# Patient Record
Sex: FEMALE | Race: BLACK OR AFRICAN AMERICAN | URBAN - METROPOLITAN AREA
[De-identification: names, ages, dates, MRNs, and addresses within clinical notes are randomized per-mention and may not be internally consistent; named-entity substitution may affect disease eponyms.]

---

## 2017-04-12 ENCOUNTER — HISTORICAL (OUTPATIENT)
Dept: ADMINISTRATIVE | Facility: HOSPITAL | Age: 52
End: 2017-04-12

## 2018-01-25 ENCOUNTER — HISTORICAL (OUTPATIENT)
Dept: ADMINISTRATIVE | Facility: HOSPITAL | Age: 53
End: 2018-01-25

## 2018-11-01 ENCOUNTER — HISTORICAL (OUTPATIENT)
Dept: ADMINISTRATIVE | Facility: HOSPITAL | Age: 53
End: 2018-11-01

## 2021-09-28 ENCOUNTER — HISTORICAL (OUTPATIENT)
Dept: ADMINISTRATIVE | Facility: HOSPITAL | Age: 56
End: 2021-09-28

## 2022-04-11 ENCOUNTER — HISTORICAL (OUTPATIENT)
Dept: ADMINISTRATIVE | Facility: HOSPITAL | Age: 57
End: 2022-04-11

## 2022-04-24 VITALS
SYSTOLIC BLOOD PRESSURE: 134 MMHG | HEIGHT: 65 IN | WEIGHT: 291 LBS | DIASTOLIC BLOOD PRESSURE: 79 MMHG | BODY MASS INDEX: 48.48 KG/M2

## 2022-05-04 NOTE — HISTORICAL [facility] CERNER
This is a historical note converted from [facility]. Formatting and pictures may have been removed.  Please reference [facility] for original formatting and attached multimedia. Chief Complaint  New patient here with right knee pain. Pain x6 months. Better but still feels discomfort.  History of Present Illness  Patient comes in today for her first visit. ?Patient complains of right knee pain. ?She denies any specific trauma or recent injury.? She states she has pain while getting up from a seated position pain with stairs pain with long standing and walking not relieved the rest of medication. ?She denies any instability?she does complain of some grinding sensation. ?She denies any radiculopathy she denies any other complaints.  Physical Exam  Vitals & Measurements  HR:?91?(Peripheral)? BP:?134/79?  HT:?166?cm? HT:?166?cm? HT:?166?cm? WT:?132?kg? WT:?132?kg? WT:?132?kg? BMI:?47.9?  Patient is one is well-developed female she is awake alert and oriented ?3 she is in no apparent distress she is pleasant and cooperative. ?Examination of the right lower extremity compartment soft and warm. ?Skin is intact. ?There is no signs or symptoms of DVT or infection.? Examination of the right knee there is no effusion she is tender about the patellofemoral joint positive patella grind negative apprehension. ?Her motion is 5-115?. ?She is stable to stressing. ?She walks a slight antalgic gait there is no calf tenderness she is neurovascular intact distally.? X-rays 3 views the right knee demonstrates arthritic changes  Assessment/Plan  1.?Osteoarthritis of right knee  ? At this time we discussed her physical exam and x-ray findings.? I suspect she likely has aggravated her pre-existing arthritis. ?We have discussed some patellofemoral tracking?exercises. ?We have discussed some low impact activities. ?She was given a pamphlet on this. ?Under sterile technique she tolerated a steroid injection very well to the anterolateral portal of her  right knee.? I would like to see her back in 4 weeks to see how she is progressing.  Ordered:  dexamethasone, 8 mg, Intra-Articular, Once, first dose 01/25/18 16:00:00 CST, stop date 01/25/18 16:00:00 CST  Lidocaine inj., 5 mL, Intra-Articular, Once, first dose 01/25/18 15:41:00 CST, stop date 01/25/18 15:41:00 CST  asp/inj jnt/bursa, major [id number] PC, 01/25/18 15:41:00 CST, [facility] - [facility], Routine, 01/25/18 15:41:00 CST  Clinic Follow up, *Est. 02/22/18 3:00:00 CST, Order for future visit, Osteoarthritis of right knee  Chondromalacia, [facility]  Office/Outpatient Visit Level 3 New [id number] PC, Osteoarthritis of right knee  Chondromalacia, [facility], 01/25/18 15:41:00 CST  ?  2.?Chondromalacia  Ordered:  dexamethasone, 8 mg, Intra-Articular, Once, first dose 01/25/18 16:00:00 CST, stop date 01/25/18 16:00:00 CST  Lidocaine inj., 5 mL, Intra-Articular, Once, first dose 01/25/18 15:41:00 CST, stop date 01/25/18 15:41:00 CST  asp/inj jnt/bursa, major [id number] PC, 01/25/18 15:41:00 CST, [facility], Routine, 01/25/18 15:41:00 CST  Clinic Follow up, *Est. 02/22/18 3:00:00 CST, Order for future visit, Osteoarthritis of right knee  Chondromalacia, [facility]  Office/Outpatient Visit Level 3 New [id number] PC, Osteoarthritis of right knee  Chondromalacia, [facility] - [facility], 01/25/18 15:41:00 CST  ?  Right knee pain  ?  Orders:  famotidine-ibuprofen, 1 tab(s), Oral, TID, do not crush or chew, # 90 tab(s), 0 Refill(s), [facility]: [facility]   Problem List/Past Medical History  Ongoing  Hypertension  Morbid obesity  Historical  Procedure/Surgical History  Breast reduction (2017)  Tumor  Medications  dexamethasone, 8 mg, Intra-Articular, Once  Duexis 800 mg-26.6 mg oral tablet, 1 tab(s), Oral, TID  Fioricet oral tablet, 1 tab(s), Oral, q4hr,? ?Not taking  hydrochlorothiazide 25 mg oral tablet, 25 mg= 1 tab(s), Oral, Daily,? ?Not taking  Lidocaine inj., 5 mL, Intra-Articular,  Once  LOSARTAN-HCTZ 100-25 MG TAB, 1 tab(s), Oral, Daily  Allergies  keflex  penicillin  Social History  Alcohol - 01/25/2018  Current, Wine, Liquor, 1-2 times per month  Employment/School - 01/25/2018  Employed  Substance Abuse - 01/25/2018  Never  Tobacco - 01/25/2018  Never smoker  Family History  Diabetes mellitus type 1.: Mother and Father.  Heart disease 09-AUG-2016 15:25:48<$>: Mother and Father.  Hypertension.: Mother and Father.